# Patient Record
Sex: MALE | Race: WHITE
[De-identification: names, ages, dates, MRNs, and addresses within clinical notes are randomized per-mention and may not be internally consistent; named-entity substitution may affect disease eponyms.]

---

## 2017-04-03 ENCOUNTER — HOSPITAL ENCOUNTER (OUTPATIENT)
Dept: HOSPITAL 47 - ORWHC2ENDO | Age: 57
Discharge: HOME | End: 2017-04-03
Attending: SURGERY
Payer: COMMERCIAL

## 2017-04-03 VITALS — TEMPERATURE: 97.5 F | RESPIRATION RATE: 16 BRPM

## 2017-04-03 VITALS — BODY MASS INDEX: 22 KG/M2

## 2017-04-03 VITALS — DIASTOLIC BLOOD PRESSURE: 75 MMHG | HEART RATE: 66 BPM | SYSTOLIC BLOOD PRESSURE: 107 MMHG

## 2017-04-03 DIAGNOSIS — Z79.84: ICD-10-CM

## 2017-04-03 DIAGNOSIS — G43.909: ICD-10-CM

## 2017-04-03 DIAGNOSIS — Z79.899: ICD-10-CM

## 2017-04-03 DIAGNOSIS — Z87.891: ICD-10-CM

## 2017-04-03 DIAGNOSIS — Z79.82: ICD-10-CM

## 2017-04-03 DIAGNOSIS — I25.2: ICD-10-CM

## 2017-04-03 DIAGNOSIS — Z12.11: Primary | ICD-10-CM

## 2017-04-03 DIAGNOSIS — E11.9: ICD-10-CM

## 2017-04-03 DIAGNOSIS — I10: ICD-10-CM

## 2017-04-03 DIAGNOSIS — F12.90: ICD-10-CM

## 2017-04-03 DIAGNOSIS — I25.10: ICD-10-CM

## 2017-04-03 LAB — GLUCOSE BLD-MCNC: 156 MG/DL (ref 75–99)

## 2017-04-03 PROCEDURE — 45378 DIAGNOSTIC COLONOSCOPY: CPT

## 2017-04-03 NOTE — P.GSHP
History of Present Illness


H&P Date: 04/03/17


Chief Complaint: Screening colonoscopy





This is a 56-year-old male who presents today for screening colonoscopy.  He 

has never had a colonoscopy before.





Past Medical History


Past Medical History: Diabetes Mellitus, Hypertension, Myocardial Infarction (MI

)


Additional Past Medical History / Comment(s): hx migraines, hx ulcer,


Last Myocardial Infarction Date:: 2012


History of Any Multi-Drug Resistant Organisms: None Reported


Past Surgical History: No Surgical Hx Reported


Past Anesthesia/Blood Transfusion Reactions: No Reported Reaction


Past Psychological History: No Psychological Hx Reported


Smoking Status: Former smoker


Past Alcohol Use History: None Reported


Additional Past Alcohol Use History / Comment(s): quit smoking 16 yrs ago, 

smoked since age 6


Past Drug Use History: Marijuana





- Past Family History


  ** Sister(s)


Family Medical History: Cancer





Medications and Allergies


 Home Medications











 Medication  Instructions  Recorded  Confirmed  Type


 


Aspirin [Adult Low Dose Aspirin EC] 81 mg PO DAILY 03/30/17 04/03/17 History


 


Ergocalciferol [Vitamin D2] 50,000 unit PO Q7D 03/30/17 04/03/17 History


 


Ramipril [Altace] 2.5 mg PO 1300 03/30/17 04/03/17 History


 


metFORMIN HCL 1,000 mg PO BID 03/30/17 04/03/17 History











 Allergies











Allergy/AdvReac Type Severity Reaction Status Date / Time


 


onion Allergy  Dyspnea,throat Verified 03/30/17 16:00





   swelling  














Surgical - Exam


 Vital Signs











Temp Pulse Resp BP Pulse Ox


 


 97.5 F L  73   16   114/78   99 


 


 04/03/17 07:18  04/03/17 07:18  04/03/17 07:18  04/03/17 07:18  04/03/17 07:18














- General


well developed, no distress





- Eyes


PERRL





- ENT


normal pinna





- Neck


no masses





- Respiratory


normal expansion





- Cardiovascular


Rhythm: regular





- Abdomen


Abdomen: soft, non tender





Results





- Labs


 Abnormal Lab Results - Last 24 Hours (Table)











  04/03/17 Range/Units





  07:22 


 


POC Glucose (mg/dL)  156 H  (75-99)  mg/dL














Assessment and Plan


Plan: 





We'll perform initial screening colonoscopy.

## 2017-04-03 NOTE — P.OP
Date of Procedure: 04/03/17


Preoperative Diagnosis: 


Screening colonoscopy


Postoperative Diagnosis: 


Normal colonoscopy


Procedure(s) Performed: 


Colonoscopy


Anesthesia: MAC


Surgeon: Bandar Hines


Pathology: none sent


Condition: stable


Disposition: PACU


Description of Procedure: 





PROCEDURE:  The patient was placed on the endoscopy table in the lateral 

position.  Digital rectal examination was performed which revealed no 

abnormalities.  The prostate was symmetrical without nodules.  Flexible 

colonoscope was then placed in the patient's anus and passed throughout the 

entire colon.  The ileocecal valve was visualized.  The cecum, ascending, 

transverse, descending and sigmoid colon were normal.  The rectum was normal as 

well.  There were no masses, polyps or diverticula noted in the entire colon. 





SUMMARY OF FINDINGS:  


Normal colonoscopy.

## 2018-05-09 ENCOUNTER — HOSPITAL ENCOUNTER (INPATIENT)
Dept: HOSPITAL 47 - EC | Age: 58
LOS: 2 days | Discharge: HOME | DRG: 369 | End: 2018-05-11
Payer: COMMERCIAL

## 2018-05-09 VITALS — RESPIRATION RATE: 16 BRPM

## 2018-05-09 DIAGNOSIS — Z79.84: ICD-10-CM

## 2018-05-09 DIAGNOSIS — K29.70: ICD-10-CM

## 2018-05-09 DIAGNOSIS — D62: ICD-10-CM

## 2018-05-09 DIAGNOSIS — D72.829: ICD-10-CM

## 2018-05-09 DIAGNOSIS — Z79.82: ICD-10-CM

## 2018-05-09 DIAGNOSIS — E78.5: ICD-10-CM

## 2018-05-09 DIAGNOSIS — Z91.018: ICD-10-CM

## 2018-05-09 DIAGNOSIS — G43.909: ICD-10-CM

## 2018-05-09 DIAGNOSIS — I25.2: ICD-10-CM

## 2018-05-09 DIAGNOSIS — Z79.899: ICD-10-CM

## 2018-05-09 DIAGNOSIS — K22.6: Primary | ICD-10-CM

## 2018-05-09 DIAGNOSIS — Z87.891: ICD-10-CM

## 2018-05-09 DIAGNOSIS — I10: ICD-10-CM

## 2018-05-09 DIAGNOSIS — E11.51: ICD-10-CM

## 2018-05-09 DIAGNOSIS — E11.40: ICD-10-CM

## 2018-05-09 LAB
ALBUMIN SERPL-MCNC: 4 G/DL (ref 3.5–5)
ALP SERPL-CCNC: 30 U/L (ref 38–126)
ALT SERPL-CCNC: 21 U/L (ref 21–72)
ANION GAP SERPL CALC-SCNC: 12 MMOL/L
APTT BLD: 21.6 SEC (ref 22–30)
AST SERPL-CCNC: 17 U/L (ref 17–59)
BASOPHILS # BLD AUTO: 0 K/UL (ref 0–0.2)
BASOPHILS # BLD AUTO: 0 K/UL (ref 0–0.2)
BASOPHILS NFR BLD AUTO: 0 %
BASOPHILS NFR BLD AUTO: 0 %
BUN SERPL-SCNC: 25 MG/DL (ref 9–20)
CALCIUM SPEC-MCNC: 8.9 MG/DL (ref 8.4–10.2)
CHLORIDE SERPL-SCNC: 104 MMOL/L (ref 98–107)
CK SERPL-CCNC: 73 U/L (ref 55–170)
CO2 SERPL-SCNC: 27 MMOL/L (ref 22–30)
EOSINOPHIL # BLD AUTO: 0 K/UL (ref 0–0.7)
EOSINOPHIL # BLD AUTO: 0.1 K/UL (ref 0–0.7)
EOSINOPHIL NFR BLD AUTO: 0 %
EOSINOPHIL NFR BLD AUTO: 0 %
ERYTHROCYTE [DISTWIDTH] IN BLOOD BY AUTOMATED COUNT: 3.64 M/UL (ref 4.3–5.9)
ERYTHROCYTE [DISTWIDTH] IN BLOOD BY AUTOMATED COUNT: 4.38 M/UL (ref 4.3–5.9)
ERYTHROCYTE [DISTWIDTH] IN BLOOD: 12.9 % (ref 11.5–15.5)
ERYTHROCYTE [DISTWIDTH] IN BLOOD: 12.9 % (ref 11.5–15.5)
GLUCOSE BLD-MCNC: 125 MG/DL (ref 75–99)
GLUCOSE BLD-MCNC: 163 MG/DL (ref 75–99)
GLUCOSE SERPL-MCNC: 191 MG/DL (ref 74–99)
HCT VFR BLD AUTO: 30.6 % (ref 39–53)
HCT VFR BLD AUTO: 36.7 % (ref 39–53)
HGB BLD-MCNC: 10.9 GM/DL (ref 13–17.5)
HGB BLD-MCNC: 13.4 GM/DL (ref 13–17.5)
INR PPP: 1.2 (ref ?–1.2)
LYMPHOCYTES # SPEC AUTO: 1.6 K/UL (ref 1–4.8)
LYMPHOCYTES # SPEC AUTO: 1.8 K/UL (ref 1–4.8)
LYMPHOCYTES NFR SPEC AUTO: 12 %
LYMPHOCYTES NFR SPEC AUTO: 14 %
MCH RBC QN AUTO: 30 PG (ref 25–35)
MCH RBC QN AUTO: 30.6 PG (ref 25–35)
MCHC RBC AUTO-ENTMCNC: 35.7 G/DL (ref 31–37)
MCHC RBC AUTO-ENTMCNC: 36.5 G/DL (ref 31–37)
MCV RBC AUTO: 83.9 FL (ref 80–100)
MCV RBC AUTO: 84 FL (ref 80–100)
MONOCYTES # BLD AUTO: 0.4 K/UL (ref 0–1)
MONOCYTES # BLD AUTO: 0.6 K/UL (ref 0–1)
MONOCYTES NFR BLD AUTO: 3 %
MONOCYTES NFR BLD AUTO: 5 %
NEUTROPHILS # BLD AUTO: 10.1 K/UL (ref 1.3–7.7)
NEUTROPHILS # BLD AUTO: 11.1 K/UL (ref 1.3–7.7)
NEUTROPHILS NFR BLD AUTO: 80 %
NEUTROPHILS NFR BLD AUTO: 84 %
PLATELET # BLD AUTO: 216 K/UL (ref 150–450)
PLATELET # BLD AUTO: 256 K/UL (ref 150–450)
POTASSIUM SERPL-SCNC: 3.8 MMOL/L (ref 3.5–5.1)
PROT SERPL-MCNC: 6.3 G/DL (ref 6.3–8.2)
PT BLD: 11.3 SEC (ref 9–12)
SALICYLATES SERPL-MCNC: <1 MG/DL
SODIUM SERPL-SCNC: 143 MMOL/L (ref 137–145)
TROPONIN I SERPL-MCNC: <0.012 NG/ML (ref 0–0.03)
WBC # BLD AUTO: 12.6 K/UL (ref 3.8–10.6)
WBC # BLD AUTO: 13.3 K/UL (ref 3.8–10.6)

## 2018-05-09 PROCEDURE — 36415 COLL VENOUS BLD VENIPUNCTURE: CPT

## 2018-05-09 PROCEDURE — 84484 ASSAY OF TROPONIN QUANT: CPT

## 2018-05-09 PROCEDURE — 83036 HEMOGLOBIN GLYCOSYLATED A1C: CPT

## 2018-05-09 PROCEDURE — 99285 EMERGENCY DEPT VISIT HI MDM: CPT

## 2018-05-09 PROCEDURE — 86850 RBC ANTIBODY SCREEN: CPT

## 2018-05-09 PROCEDURE — 96375 TX/PRO/DX INJ NEW DRUG ADDON: CPT

## 2018-05-09 PROCEDURE — 82553 CREATINE MB FRACTION: CPT

## 2018-05-09 PROCEDURE — 93306 TTE W/DOPPLER COMPLETE: CPT

## 2018-05-09 PROCEDURE — 85730 THROMBOPLASTIN TIME PARTIAL: CPT

## 2018-05-09 PROCEDURE — 43235 EGD DIAGNOSTIC BRUSH WASH: CPT

## 2018-05-09 PROCEDURE — 93005 ELECTROCARDIOGRAM TRACING: CPT

## 2018-05-09 PROCEDURE — 85025 COMPLETE CBC W/AUTO DIFF WBC: CPT

## 2018-05-09 PROCEDURE — 85610 PROTHROMBIN TIME: CPT

## 2018-05-09 PROCEDURE — 96374 THER/PROPH/DIAG INJ IV PUSH: CPT

## 2018-05-09 PROCEDURE — 96361 HYDRATE IV INFUSION ADD-ON: CPT

## 2018-05-09 PROCEDURE — 83605 ASSAY OF LACTIC ACID: CPT

## 2018-05-09 PROCEDURE — 86900 BLOOD TYPING SEROLOGIC ABO: CPT

## 2018-05-09 PROCEDURE — 82271 OCCULT BLOOD OTHER SOURCES: CPT

## 2018-05-09 PROCEDURE — 80053 COMPREHEN METABOLIC PANEL: CPT

## 2018-05-09 PROCEDURE — 86901 BLOOD TYPING SEROLOGIC RH(D): CPT

## 2018-05-09 PROCEDURE — 82550 ASSAY OF CK (CPK): CPT

## 2018-05-09 PROCEDURE — 80048 BASIC METABOLIC PNL TOTAL CA: CPT

## 2018-05-09 PROCEDURE — 83520 IMMUNOASSAY QUANT NOS NONAB: CPT

## 2018-05-09 RX ADMIN — PANTOPRAZOLE SODIUM SCH MG: 40 INJECTION, POWDER, FOR SOLUTION INTRAVENOUS at 20:05

## 2018-05-09 NOTE — ED
General Adult HPI





- General


Chief complaint: GI Bleed


Stated complaint: Vomiting Blood


Time Seen by Provider: 05/09/18 13:20


Source: patient, EMS, RN notes reviewed


Mode of arrival: EMS


Limitations: no limitations





- History of Present Illness


Initial comments: 





This is a 57-year-old male who presents emergency Department with a complaint 

of syncope.  Patient states he got up and felt lightheaded and nauseated went 

to the bathroom and vomited up blood 3 times and when EMS arrived he was 

unresponsive.  He became responsive soon thereafter.  Patient denies any 

drinking history patient denies any previous history of vomiting up blood.  

Patient denies any blood disorders.  Patient denies any pain.  Patient 

currently states he has no headache no numbness or weakness.  Patient denies 

lightheadedness while lying in bed.  Patient denies chest pain palpitations 

difficulty breathing shortness of breath.  Patient denies any abdominal pain.  

Patient states he still mildly nauseated.  Patient denies any recent fever 

chills or cough.  Patient is unaware of why this is happening.





- Related Data


 Home Medications











 Medication  Instructions  Recorded  Confirmed


 


Aspirin [Adult Low Dose Aspirin EC] 81 mg PO DAILY 03/30/17 05/09/18


 


Ergocalciferol [Vitamin D2] 50,000 unit PO SA 03/30/17 05/09/18


 


Ramipril [Altace] 2.5 mg PO DAILY 03/30/17 05/09/18


 


metFORMIN HCL 1,000 mg PO BID 03/30/17 05/09/18


 


Cilostazol [Pletal] 100 mg PO BID 05/09/18 05/09/18


 


Fenofibrate Nanocrystallized 145 mg PO DAILY 05/09/18 05/09/18





[Fenofibrate]   


 


Gabapentin 600 mg PO HS 05/09/18 05/09/18











 Allergies











Allergy/AdvReac Type Severity Reaction Status Date / Time


 


onion Allergy  Dyspnea,throat Verified 05/09/18 13:37





   swelling  














Review of Systems


ROS Statement: 


Those systems with pertinent positive or pertinent negative responses have been 

documented in the HPI.





ROS Other: All systems not noted in ROS Statement are negative.





Past Medical History


Past Medical History: Diabetes Mellitus, Hypertension, Myocardial Infarction (MI

)


Additional Past Medical History / Comment(s): hx migraines, hx ulcer,


Last Myocardial Infarction Date:: 2012


History of Any Multi-Drug Resistant Organisms: None Reported


Past Surgical History: No Surgical Hx Reported


Past Anesthesia/Blood Transfusion Reactions: No Reported Reaction


Past Psychological History: No Psychological Hx Reported


Smoking Status: Former smoker


Past Alcohol Use History: None Reported


Past Drug Use History: Marijuana





- Past Family History


  ** Sister(s)


Family Medical History: Cancer





General Exam





- General Exam Comments


Initial Comments: 





GENERAL:


Patient is well-developed and well-nourished.  Patient is nontoxic and well-

hydrated and is in mild distress.





ENT:


Neck is soft and supple.  No significant lymphadenopathy is noted.  Oropharynx 

is clear.  Moist mucous membranes.  Neck has full range of motion without 

eliciting any pain.  





EYES:


The sclera were anicteric and conjunctiva were pink and moist.  Extraocular 

movements were intact and pupils were equal round and reactive to light.  

Eyelids were unremarkable.





PULMONARY:


Unlabored respirations.  Good breath sounds bilaterally.  No audible rales 

rhonchi or wheezing was noted.





CARDIOVASCULAR:


There is a regular rate and rhythm without any murmurs gallops or rubs.  





ABDOMEN:


Soft and nontender with normal bowel sounds.  No palpable organomegaly was 

noted.  There is no palpable pulsatile mass.





SKIN:


Skin is clear with no lesions or rashes and otherwise unremarkable.





NEUROLOGIC:


Patient is alert and oriented x3.  Cranial nerves II through XII are grossly 

intact.  Motor and sensory are also intact.  Normal speech, volume and content.

  Symmetrical smile.  





MUSCULOSKELETAL:


Normal extremities with adequate strength and full range of motion.  No lower 

extremity swelling or edema.  No calf tenderness.





LYMPHATICS:


No significant lymphadenopathy is noted





PSYCHIATRIC:


Normal psychiatric evaluation.  Normal interpersonal interactions appears 

functionally intact in deals appropriately with others.  No signs of 

depression.  No signs of anxiety. 


Limitations: no limitations





Course


 Vital Signs











  05/09/18 05/09/18 05/09/18





  13:18 14:00 14:25


 


Temperature 96.9 F L  


 


Pulse Rate 96 75 96


 


Respiratory 16 20 20





Rate   


 


Blood Pressure 125/79 126/85 113/76


 


O2 Sat by Pulse 97 99 100





Oximetry   














Medical Decision Making





- Medical Decision Making





EKG shows normal sinus rhythm at 89 bpm SC interval is 124 tresses 84 QT 

interval 370 QTC is 450.  Patient's EKG shows no ST segment elevation or 

depression or T wave abnormalities are noted.





Patient vomited while in the emergency department it did look like hematemesis 

however when we tested it came up negative.





I spoke with Dr. Mera I will be admitting the patient I will be consulting GI 

and I will be doing repeat CBCs.





I wrote admitting orders.





- Lab Data


Result diagrams: 


 05/09/18 14:05





 05/09/18 14:05


 Lab Results











  05/09/18 05/09/18 05/09/18 Range/Units





  14:05 14:05 14:05 


 


WBC   13.3 H   (3.8-10.6)  k/uL


 


RBC   4.38   (4.30-5.90)  m/uL


 


Hgb   13.4   (13.0-17.5)  gm/dL


 


Hct   36.7 L   (39.0-53.0)  %


 


MCV   83.9   (80.0-100.0)  fL


 


MCH   30.6   (25.0-35.0)  pg


 


MCHC   36.5   (31.0-37.0)  g/dL


 


RDW   12.9   (11.5-15.5)  %


 


Plt Count   256   (150-450)  k/uL


 


Neutrophils %   84   %


 


Lymphocytes %   12   %


 


Monocytes %   3   %


 


Eosinophils %   0   %


 


Basophils %   0   %


 


Neutrophils #   11.1 H   (1.3-7.7)  k/uL


 


Lymphocytes #   1.6   (1.0-4.8)  k/uL


 


Monocytes #   0.4   (0-1.0)  k/uL


 


Eosinophils #   0.1   (0-0.7)  k/uL


 


Basophils #   0.0   (0-0.2)  k/uL


 


PT     (9.0-12.0)  sec


 


INR     (<1.2)  


 


APTT     (22.0-30.0)  sec


 


Sodium    143  (137-145)  mmol/L


 


Potassium    3.8  (3.5-5.1)  mmol/L


 


Chloride    104  ()  mmol/L


 


Carbon Dioxide    27  (22-30)  mmol/L


 


Anion Gap    12  mmol/L


 


BUN    25 H  (9-20)  mg/dL


 


Creatinine    0.71  (0.66-1.25)  mg/dL


 


Est GFR (CKD-EPI)AfAm    >90  (>60 ml/min/1.73 sqM)  


 


Est GFR (CKD-EPI)NonAf    >90  (>60 ml/min/1.73 sqM)  


 


Glucose    191 H  (74-99)  mg/dL


 


Plasma Lactic Acid Alin     (0.7-2.0)  mmol/L


 


Calcium    8.9  (8.4-10.2)  mg/dL


 


Total Bilirubin    0.4  (0.2-1.3)  mg/dL


 


AST    17  (17-59)  U/L


 


ALT    21  (21-72)  U/L


 


Alkaline Phosphatase    30 L  ()  U/L


 


Total Creatine Kinase  73    ()  U/L


 


CK-MB (CK-2)  1.6    (0.0-2.4)  ng/mL


 


CK-MB (CK-2) Rel Index  2.2    


 


Troponin I  <0.012    (0.000-0.034)  ng/mL


 


Total Protein    6.3  (6.3-8.2)  g/dL


 


Albumin    4.0  (3.5-5.0)  g/dL


 


Gastric Occult Blood     (Negative)  


 


Salicylates    <1.0  mg/dL


 


Blood Type     


 


Blood Type Recheck     


 


Antibody Screen     


 


Spec Expiration Date     














  05/09/18 05/09/18 05/09/18 Range/Units





  14:05 14:05 14:05 


 


WBC     (3.8-10.6)  k/uL


 


RBC     (4.30-5.90)  m/uL


 


Hgb     (13.0-17.5)  gm/dL


 


Hct     (39.0-53.0)  %


 


MCV     (80.0-100.0)  fL


 


MCH     (25.0-35.0)  pg


 


MCHC     (31.0-37.0)  g/dL


 


RDW     (11.5-15.5)  %


 


Plt Count     (150-450)  k/uL


 


Neutrophils %     %


 


Lymphocytes %     %


 


Monocytes %     %


 


Eosinophils %     %


 


Basophils %     %


 


Neutrophils #     (1.3-7.7)  k/uL


 


Lymphocytes #     (1.0-4.8)  k/uL


 


Monocytes #     (0-1.0)  k/uL


 


Eosinophils #     (0-0.7)  k/uL


 


Basophils #     (0-0.2)  k/uL


 


PT  11.3    (9.0-12.0)  sec


 


INR  1.2 H    (<1.2)  


 


APTT  21.6 L    (22.0-30.0)  sec


 


Sodium     (137-145)  mmol/L


 


Potassium     (3.5-5.1)  mmol/L


 


Chloride     ()  mmol/L


 


Carbon Dioxide     (22-30)  mmol/L


 


Anion Gap     mmol/L


 


BUN     (9-20)  mg/dL


 


Creatinine     (0.66-1.25)  mg/dL


 


Est GFR (CKD-EPI)AfAm     (>60 ml/min/1.73 sqM)  


 


Est GFR (CKD-EPI)NonAf     (>60 ml/min/1.73 sqM)  


 


Glucose     (74-99)  mg/dL


 


Plasma Lactic Acid Alin    1.1  (0.7-2.0)  mmol/L


 


Calcium     (8.4-10.2)  mg/dL


 


Total Bilirubin     (0.2-1.3)  mg/dL


 


AST     (17-59)  U/L


 


ALT     (21-72)  U/L


 


Alkaline Phosphatase     ()  U/L


 


Total Creatine Kinase     ()  U/L


 


CK-MB (CK-2)     (0.0-2.4)  ng/mL


 


CK-MB (CK-2) Rel Index     


 


Troponin I     (0.000-0.034)  ng/mL


 


Total Protein     (6.3-8.2)  g/dL


 


Albumin     (3.5-5.0)  g/dL


 


Gastric Occult Blood     (Negative)  


 


Salicylates     mg/dL


 


Blood Type   A Negative   


 


Blood Type Recheck   CABO Indicated   


 


Antibody Screen   NEGATIVE   


 


Spec Expiration Date   05/12/2018 - 2305 05/09/18 Range/Units





  14:25 


 


WBC   (3.8-10.6)  k/uL


 


RBC   (4.30-5.90)  m/uL


 


Hgb   (13.0-17.5)  gm/dL


 


Hct   (39.0-53.0)  %


 


MCV   (80.0-100.0)  fL


 


MCH   (25.0-35.0)  pg


 


MCHC   (31.0-37.0)  g/dL


 


RDW   (11.5-15.5)  %


 


Plt Count   (150-450)  k/uL


 


Neutrophils %   %


 


Lymphocytes %   %


 


Monocytes %   %


 


Eosinophils %   %


 


Basophils %   %


 


Neutrophils #   (1.3-7.7)  k/uL


 


Lymphocytes #   (1.0-4.8)  k/uL


 


Monocytes #   (0-1.0)  k/uL


 


Eosinophils #   (0-0.7)  k/uL


 


Basophils #   (0-0.2)  k/uL


 


PT   (9.0-12.0)  sec


 


INR   (<1.2)  


 


APTT   (22.0-30.0)  sec


 


Sodium   (137-145)  mmol/L


 


Potassium   (3.5-5.1)  mmol/L


 


Chloride   ()  mmol/L


 


Carbon Dioxide   (22-30)  mmol/L


 


Anion Gap   mmol/L


 


BUN   (9-20)  mg/dL


 


Creatinine   (0.66-1.25)  mg/dL


 


Est GFR (CKD-EPI)AfAm   (>60 ml/min/1.73 sqM)  


 


Est GFR (CKD-EPI)NonAf   (>60 ml/min/1.73 sqM)  


 


Glucose   (74-99)  mg/dL


 


Plasma Lactic Acid Alin   (0.7-2.0)  mmol/L


 


Calcium   (8.4-10.2)  mg/dL


 


Total Bilirubin   (0.2-1.3)  mg/dL


 


AST   (17-59)  U/L


 


ALT   (21-72)  U/L


 


Alkaline Phosphatase   ()  U/L


 


Total Creatine Kinase   ()  U/L


 


CK-MB (CK-2)   (0.0-2.4)  ng/mL


 


CK-MB (CK-2) Rel Index   


 


Troponin I   (0.000-0.034)  ng/mL


 


Total Protein   (6.3-8.2)  g/dL


 


Albumin   (3.5-5.0)  g/dL


 


Gastric Occult Blood  Negative  (Negative)  


 


Salicylates   mg/dL


 


Blood Type   


 


Blood Type Recheck   


 


Antibody Screen   


 


Spec Expiration Date   














Disposition


Clinical Impression: 


 Syncope, Acute vomiting





Disposition: ADMITTED AS IP TO THIS HOSP


Referrals: 


Susie Theodore DO [Primary Care Provider] - 1-2 days


Time of Disposition: 15:07

## 2018-05-10 LAB
ANION GAP SERPL CALC-SCNC: 10 MMOL/L
BASOPHILS # BLD AUTO: 0 K/UL (ref 0–0.2)
BASOPHILS NFR BLD AUTO: 0 %
BUN SERPL-SCNC: 17 MG/DL (ref 9–20)
CALCIUM SPEC-MCNC: 8.5 MG/DL (ref 8.4–10.2)
CHLORIDE SERPL-SCNC: 107 MMOL/L (ref 98–107)
CO2 SERPL-SCNC: 26 MMOL/L (ref 22–30)
EOSINOPHIL # BLD AUTO: 0.1 K/UL (ref 0–0.7)
EOSINOPHIL NFR BLD AUTO: 1 %
ERYTHROCYTE [DISTWIDTH] IN BLOOD BY AUTOMATED COUNT: 3.81 M/UL (ref 4.3–5.9)
ERYTHROCYTE [DISTWIDTH] IN BLOOD: 13.3 % (ref 11.5–15.5)
GLUCOSE BLD-MCNC: 105 MG/DL (ref 75–99)
GLUCOSE BLD-MCNC: 120 MG/DL (ref 75–99)
GLUCOSE BLD-MCNC: 122 MG/DL (ref 75–99)
GLUCOSE BLD-MCNC: 129 MG/DL (ref 75–99)
GLUCOSE SERPL-MCNC: 121 MG/DL (ref 74–99)
HBA1C MFR BLD: 5.7 % (ref 4–6)
HCT VFR BLD AUTO: 32.8 % (ref 39–53)
HGB BLD-MCNC: 11.2 GM/DL (ref 13–17.5)
LYMPHOCYTES # SPEC AUTO: 2.3 K/UL (ref 1–4.8)
LYMPHOCYTES NFR SPEC AUTO: 21 %
MCH RBC QN AUTO: 29.3 PG (ref 25–35)
MCHC RBC AUTO-ENTMCNC: 34.1 G/DL (ref 31–37)
MCV RBC AUTO: 86.1 FL (ref 80–100)
MONOCYTES # BLD AUTO: 0.5 K/UL (ref 0–1)
MONOCYTES NFR BLD AUTO: 5 %
NEUTROPHILS # BLD AUTO: 7.9 K/UL (ref 1.3–7.7)
NEUTROPHILS NFR BLD AUTO: 73 %
PLATELET # BLD AUTO: 218 K/UL (ref 150–450)
POTASSIUM SERPL-SCNC: 3.5 MMOL/L (ref 3.5–5.1)
SODIUM SERPL-SCNC: 143 MMOL/L (ref 137–145)
WBC # BLD AUTO: 10.9 K/UL (ref 3.8–10.6)

## 2018-05-10 RX ADMIN — INSULIN ASPART SCH: 100 INJECTION, SOLUTION INTRAVENOUS; SUBCUTANEOUS at 13:40

## 2018-05-10 RX ADMIN — PANTOPRAZOLE SODIUM SCH MG: 40 INJECTION, POWDER, FOR SOLUTION INTRAVENOUS at 20:59

## 2018-05-10 RX ADMIN — INSULIN ASPART SCH: 100 INJECTION, SOLUTION INTRAVENOUS; SUBCUTANEOUS at 17:29

## 2018-05-10 RX ADMIN — INSULIN ASPART SCH: 100 INJECTION, SOLUTION INTRAVENOUS; SUBCUTANEOUS at 20:59

## 2018-05-10 RX ADMIN — PANTOPRAZOLE SODIUM SCH MG: 40 INJECTION, POWDER, FOR SOLUTION INTRAVENOUS at 08:39

## 2018-05-10 NOTE — ECHOF
Referral Reason:syncope



MEASUREMENTS

--------

HEIGHT: 172.7 cm

WEIGHT: 63.0 kg

BP: 108/70

RVIDd:   3.0 cm     (< 3.3)

IVSd:   1.1 cm     (0.6 - 1.1)

LVIDd:   4.4 cm     (3.9 - 5.3)

LVPWd:   0.9 cm     (0.6 - 1.1)

IVSs:   1.6 cm

LVIDs:   2.9 cm

LVPWs:   1.4 cm

LA Diam:   3.3 cm     (2.7 - 3.8)

LAESV Index (A-L):   17.55 ml/m

Ao Diam:   3.2 cm     (2.0 - 3.7)

AV Cusp:   2.0 cm     (1.5 - 2.6)

MV EXCURSION:   20.651 mm     (> 18.000)

MV EF SLOPE:   133 mm/s     (70 - 150)

EPSS:   0.4 cm

MV E Tavares:   0.90 m/s

MV DecT:   211 ms

MV A Tavares:   0.77 m/s

MV E/A Ratio:   1.18 







FINDINGS

--------

Sinus rhythm.

This was a technically good study.

The left ventricular size is normal.   There is borderline concentric left ventricular hypertrophy.  
 Overall left ventricular systolic function is normal with, an EF between 55 - 60 %.

The right ventricle is normal in size.

Normal LA  size by volume 22+/-6 ml/m2.

The right atrium is normal in size.

The aortic valve is trileaflet and appears structurally normal.

The mitral valve is normal.

The tricuspid valve appears structurally normal.

There is no pulmonic regurgitation present.

The aortic root size is normal.

Normal inferior vena cava with normal inspiratory collapse consistent with estimated right atrial pre
ssure of  5 mmHg.

There is no pericardial effusion.



CONCLUSIONS

--------

1. Sinus rhythm.

2. This was a technically good study.

3. The left ventricular size is normal.

4. There is borderline concentric left ventricular hypertrophy.

5. Overall left ventricular systolic function is normal with, an EF between 55 - 60 %.

6. The right ventricle is normal in size.

7. Normal LA size by volume 22+/-6 ml/m2.

8. The right atrium is normal in size.

9. The aortic valve is trileaflet and appears structurally normal.

10. The mitral valve is normal.

11. The tricuspid valve appears structurally normal.

12. There is no pulmonic regurgitation present.

13. The aortic root size is normal.

14. Normal inferior vena cava with normal inspiratory collapse consistent with estimated right atrial
 pressure of  5 mmHg.

15. There is no pericardial effusion.





SONOGRAPHER: Perri Hampton RDCS

## 2018-05-10 NOTE — P.HPIM
History of Present Illness


H&P Date: 05/10/18


Chief Complaint: Vomiting blood





This is a 57-year-old male, a patient of [facility].  Patient has 

a past medical history of diabetes mellitus, hypertension, myocardial infarction

, hyperlipidemia, peripheral vascular disease.  Patient presents to the 

emergency room after having hematemesis as well as a syncopal episode.  Patient 

reports that he had not slept well the night before and woke up in the morning 

and vomited what he presumed as blood 3 times.  He does report drinking a lot 

of coffee.  When he was coming back from the restroom he passed out.  He is 

unsure of how long he passed out for.  Family members called EMS.  And 

initially when EMS arrived patient was unresponsive.  When he came to he was 

being placed in the ambulance.  Hemoglobin on admission 10.9 white count 

elevated at 12.6.  Gastric occult blood was negative on admission.  Patient had 

one episode of emesis in the ER.  He is currently nothing by mouth.  He's had 

no further emesis.  He aspirin and Pletal have been placed on hold.  Repeat 

hemoglobin this morning is 11.2.  Patient seen by GI service and offered have 

an EGD for tomorrow morning.  At this time patient is refusing.  Also requests 

patient syncopal episode and echocardiogram has been ordered as well as 

orthostatics.  Patient has been up and ambulating this morning without any 

dizziness or lightheadedness.  Denies any chest pain or shortness of breath.  

Denies any further episodes of vomiting.  Denies any bowel movement changes or 

urinary symptoms.  No previous history of GI bleed.  Denies any nicotine use or 

alcohol use.  EKG normal sinus rhythm on admission.  Patient does admit to 

having issues with heartburn on a regular basis.  He does drink about 4 cups of 

coffee a day.





Review of Systems





Please refer to HPI otherwise unremarkable





Past Medical History


Past Medical History: Diabetes Mellitus, Hypertension, Myocardial Infarction (MI

)


Additional Past Medical History / Comment(s): hx migraines, hx ulcer, on 

gabapentin when asked if he had neuropathy -he stated no


Last Myocardial Infarction Date:: 


History of Any Multi-Drug Resistant Organisms: None Reported


Past Surgical History: No Surgical Hx Reported


Additional Past Surgical History / Comment(s): colonoscopy denies having egd or 

heart caths


Past Anesthesia/Blood Transfusion Reactions: No Reported Reaction


Smoking Status: Former smoker





- Past Family History


  ** Sister(s)


Family Medical History: Cancer





  ** Father


Family Medical History: Unable to Obtain


Additional Family Medical History / Comment(s): pt stated he never knew his dad





  ** Mother


Additional Family Medical History / Comment(s): stated  from poss 

medication reaction





Medications and Allergies


 Home Medications











 Medication  Instructions  Recorded  Confirmed  Type


 


Aspirin [Adult Low Dose Aspirin EC] 81 mg PO DAILY 17 History


 


Ergocalciferol [Vitamin D2] 50,000 unit PO SA 17 History


 


Ramipril [Altace] 2.5 mg PO DAILY 17 History


 


metFORMIN HCL 1,000 mg PO BID 17 History


 


Cilostazol [Pletal] 100 mg PO BID 18 History


 


Fenofibrate Nanocrystallized 145 mg PO DAILY 18 History





[Fenofibrate]    


 


Gabapentin 600 mg PO HS 18 History











 Allergies











Allergy/AdvReac Type Severity Reaction Status Date / Time


 


onion Allergy  Dyspnea,throat Verified 18 13:37





   swelling  














Physical Exam


Vitals: 


 Vital Signs











  Temp Pulse Pulse Resp BP BP Pulse Ox


 


 05/10/18 08:00  97.8 F   84  16   108/70  99


 


 05/10/18 04:00  98 F   97  16   119/75  97


 


 18 23:37  98.5 F   91  16   123/71  98


 


 18 20:00  97.8 F   91  16   119/72  100


 


 18 17:06  97.3 F L   88  16   110/73  98


 


 18 16:00  98.3 F  85   18  111/72   100


 


 18 15:30   85   20  107/74   100


 


 18 15:00   87   20  114/76   100


 


 18 14:25   96   20  113/76   100


 


 18 14:00   75   20  126/85   99


 


 18 13:18  96.9 F L  96   16  125/79   97








 Intake and Output











 05/09/18 05/10/18 05/10/18





 22:59 06:59 14:59


 


Intake Total 800 80 


 


Output Total 600  


 


Balance 200 80 


 


Intake:   


 


   80 


 


    0.9  80 


 


    Sodium Chloride 0.9% 1, 800  





    000 ml @ 100 mls/hr IV .   





    Q10H ONE Rx#:[id number]   


 


Output:   


 


  Urine 600  


 


Other:   


 


  Voiding Method Toilet Toilet Toilet


 


  Weight  63.3 kg 














Head normocephalic


Neck supple


Lungs clear to auscultation bilaterally no wheezing or crackles


Heart regular rate and rhythm S1-S2, no rub or gallop


Abdomen is soft nontender nondistended positive bowel sounds no 

hepatosplenomegaly


Extremities no edema


Neuro alert and orientated to 3





Results


CBC & Chem 7: 


 05/10/18 06:06





 05/10/18 06:06


Labs: 


 Abnormal Lab Results - Last 24 Hours (Table)











  18 Range/Units





  14:05 14:05 14:05 


 


WBC  13.3 H    (3.8-10.6)  k/uL


 


RBC     (4.30-5.90)  m/uL


 


Hgb     (13.0-17.5)  gm/dL


 


Hct  36.7 L    (39.0-53.0)  %


 


Neutrophils #  11.1 H    (1.3-7.7)  k/uL


 


INR    1.2 H  (<1.2)  


 


APTT    21.6 L  (22.0-30.0)  sec


 


BUN   25 H   (9-20)  mg/dL


 


Glucose   191 H   (74-99)  mg/dL


 


POC Glucose (mg/dL)     (75-99)  mg/dL


 


Alkaline Phosphatase   30 L   ()  U/L














  18 Range/Units





  16:55 20:37 22:51 


 


WBC    12.6 H  (3.8-10.6)  k/uL


 


RBC    3.64 L  (4.30-5.90)  m/uL


 


Hgb    10.9 L  (13.0-17.5)  gm/dL


 


Hct    30.6 L  (39.0-53.0)  %


 


Neutrophils #    10.1 H  (1.3-7.7)  k/uL


 


INR     (<1.2)  


 


APTT     (22.0-30.0)  sec


 


BUN     (9-20)  mg/dL


 


Glucose     (74-99)  mg/dL


 


POC Glucose (mg/dL)  163 H  125 H   (75-99)  mg/dL


 


Alkaline Phosphatase     ()  U/L














  05/10/18 05/10/18 05/10/18 Range/Units





  05:53 06:06 06:06 


 


WBC   10.9 H   (3.8-10.6)  k/uL


 


RBC   3.81 L   (4.30-5.90)  m/uL


 


Hgb   11.2 L   (13.0-17.5)  gm/dL


 


Hct   32.8 L   (39.0-53.0)  %


 


Neutrophils #   7.9 H   (1.3-7.7)  k/uL


 


INR     (<1.2)  


 


APTT     (22.0-30.0)  sec


 


BUN     (9-20)  mg/dL


 


Glucose    121 H  (74-99)  mg/dL


 


POC Glucose (mg/dL)  120 H    (75-99)  mg/dL


 


Alkaline Phosphatase     ()  U/L














Assessment and Plan


Assessment: 





1.  Hematemesis: Concerns for possible peptic ulcer disease.  Patient seen by 

GI service and they are recommending a EGD.  Patient is refusing EGD at this 

time.  Would like to have it completed in the outpatient setting on Monday.  

Patient is on Protonix.  Aspirin and Pletal on hold





2.  Syncopal episode likely vasovagal due to the hematemesis.  However, will 

check orthostatic blood pressure is also 2-D echo








3.  Leukocytosis present on admission likely reactive: White count has 

decreased total 0.6-10.9





4.  Diabetes mellitus type 2: Resume metformin and sliding scale coverage





5.  Essential hypertension: Blood pressures stable but on the lower side at 

this time we'll hold his lisinopril





6.  Peripheral vascular disease: Hold Pletal for now due to the hematemesis





7.  Hyperlipidemia continue fenofibrate


Time with Patient: Greater than 30 (Greater than 50% of the total time spent in 

counseling and coordination of care.I performed an examination of the patient 

and discussed their management with the physician Assistant.  I have reviewed 

the Physician Assistant's notes and agree with the documented findings and plan 

of care)

## 2018-05-10 NOTE — P.CONS
History of Present Illness





- Reason for Consult


Consult date: 05/10/18


GI bleed hematemesis


Requesting physician: Annmarie Mera





- History of Present Illness


57-year-old male PMH diabetes, hypertension, migraines, neuropathy, admitted 

with acute syncopal episode with hematemesis that occurred yesterday.  He 

developed increased nausea followed by 5-6 episodes of burgundy-colored emesis.

  Presently denies abdominal pain.  Denies dyspepsia or dysphagia indigestion.  

Occasional over-the-counter Tums as needed.  Denies melena or hematochezia.  No 

history of GI bleed.  No history of EGD.  Colonoscopy about 6 months ago 

unremarkable.  He's been told in the past he has an ulcer but never documented 

by endoscopy.  He takes baby aspirin and Pletal on a daily basis.  Hemoglobin 

on admission 13.4 presently 11.2.  White count 10.9.  BUN 25.  Creatinine 0.7.  

Presently 17 and 0.7.





No EtOH NSAIDs or excessive usage of aspirin.








Review of Systems


RConstitutional: Denies fever, chills, sweats, weight gain, or loss.


HEENT: Negative for migraines, blurred vision or loss, earaches, drainage, 

tinnitus, oral mucosal lesions, dysphagia, or odynophagia.


Cardiac: Negative for chest pain, arrhythmias, or palpitation.


Respiratory: Negative for shortness of breath, hemoptysis, cough, or sputum 

production.


Gastrointestinal: See HPI for pertinent findings.


Genitourinary: Negative for hematuria, urgency, frequency, polyuria, dysuria, 

or penile discharge.


Musculoskeletal: Negative for muscle aches, swelling, arthritis, and 

arthralgias.  


Neurologic: Negative for stroke or TIA.


Endocrine: Negative for thyroid problems.


Skin: Negative for rash or itching.


Psychiatric: Negative history for depression and anxietyale








Past Medical History


Past Medical History: Diabetes Mellitus, Hypertension, Myocardial Infarction (MI

)


Additional Past Medical History / Comment(s): hx migraines, hx ulcer, on 

gabapentin when asked if he had neuropathy -he stated no


Last Myocardial Infarction Date:: 


History of Any Multi-Drug Resistant Organisms: None Reported


Past Surgical History: No Surgical Hx Reported


Additional Past Surgical History / Comment(s): colonoscopy denies having egd or 

heart caths


Past Anesthesia/Blood Transfusion Reactions: No Reported Reaction


Smoking Status: Former smoker





- Past Family History


  ** Sister(s)


Family Medical History: Cancer





  ** Father


Family Medical History: Unable to Obtain


Additional Family Medical History / Comment(s): pt stated he never knew his dad





  ** Mother


Additional Family Medical History / Comment(s): stated  from poss 

medication reaction





Medications and Allergies


 Home Medications











 Medication  Instructions  Recorded  Confirmed  Type


 


Aspirin [Adult Low Dose Aspirin EC] 81 mg PO DAILY 17 History


 


Ergocalciferol [Vitamin D2] 50,000 unit PO SA 17 History


 


Ramipril [Altace] 2.5 mg PO DAILY 17 History


 


metFORMIN HCL 1,000 mg PO BID 17 History


 


Cilostazol [Pletal] 100 mg PO BID 18 History


 


Fenofibrate Nanocrystallized 145 mg PO DAILY 18 History





[Fenofibrate]    


 


Gabapentin 600 mg PO HS 18 History











 Allergies











Allergy/AdvReac Type Severity Reaction Status Date / Time


 


onion Allergy  Dyspnea,throat Verified 18 13:37





   swelling  














Physical Exam


Vitals: 


 Vital Signs











  Temp Pulse Pulse Resp BP BP Pulse Ox


 


 05/10/18 08:00  97.8 F   84  16   108/70  99


 


 05/10/18 04:00  98 F   97  16   119/75  97


 


 18 23:37  98.5 F   91  16   123/71  98


 


 18 20:00  97.8 F   91  16   119/72  100


 


 18 17:06  97.3 F L   88  16   110/73  98


 


 18 16:00  98.3 F  85   18  111/72   100


 


 18 15:30   85   20  107/74   100


 


 18 15:00   87   20  114/76   100


 


 18 14:25   96   20  113/76   100


 


 18 14:00   75   20  126/85   99


 


 18 13:18  96.9 F L  96   16  125/79   97








 Intake and Output











 05/09/18 05/10/18 05/10/18





 22:59 06:59 14:59


 


Intake Total 800 80 


 


Output Total 600  


 


Balance 200 80 


 


Intake:   


 


   80 


 


    0.9  80 


 


    Sodium Chloride 0.9% 1, 800  





    000 ml @ 100 mls/hr IV .   





    Q10H ONE Rx#:[id number]   


 


Output:   


 


  Urine 600  


 


Other:   


 


  Voiding Method Toilet Toilet Toilet


 


  Weight  63.3 kg 











General appearance: The patient is alert, oriented, in no acute distress.


HET: Head is normocephalic and atraumatic.  Pupils are equal and reactive.  

Oropharynx is clear without lesions.


Neck: Supple without lymphadenopathy.  Trachea midline.


Heart: S1 S2.  Regular rate and rhythm.


Lungs: No crackles or wheezes are heard.


Abdomen: Soft, nontender, nondistended with  bowel sounds.  No peritoneal 

signs.  No palpable organomegaly or masses.


Extremities: Normal skin color and turgor.  No cyanosis, rash, ulceration, 

clubbing, or edema.  Radial and pedal pulses are 2/4 bilaterally.


Neurological: No focal deficits.  Strength and sensation are grossly intact.








Results


CBC & Chem 7: 


 05/10/18 06:06





 05/10/18 06:06


Labs: 


 Abnormal Lab Results - Last 24 Hours (Table)











  18 Range/Units





  14:05 14:05 14:05 


 


WBC  13.3 H    (3.8-10.6)  k/uL


 


RBC     (4.30-5.90)  m/uL


 


Hgb     (13.0-17.5)  gm/dL


 


Hct  36.7 L    (39.0-53.0)  %


 


Neutrophils #  11.1 H    (1.3-7.7)  k/uL


 


INR    1.2 H  (<1.2)  


 


APTT    21.6 L  (22.0-30.0)  sec


 


BUN   25 H   (9-20)  mg/dL


 


Glucose   191 H   (74-99)  mg/dL


 


POC Glucose (mg/dL)     (75-99)  mg/dL


 


Alkaline Phosphatase   30 L   ()  U/L














  18 Range/Units





  16:55 20:37 22:51 


 


WBC    12.6 H  (3.8-10.6)  k/uL


 


RBC    3.64 L  (4.30-5.90)  m/uL


 


Hgb    10.9 L  (13.0-17.5)  gm/dL


 


Hct    30.6 L  (39.0-53.0)  %


 


Neutrophils #    10.1 H  (1.3-7.7)  k/uL


 


INR     (<1.2)  


 


APTT     (22.0-30.0)  sec


 


BUN     (9-20)  mg/dL


 


Glucose     (74-99)  mg/dL


 


POC Glucose (mg/dL)  163 H  125 H   (75-99)  mg/dL


 


Alkaline Phosphatase     ()  U/L














  05/10/18 05/10/18 05/10/18 Range/Units





  05:53 06:06 06:06 


 


WBC   10.9 H   (3.8-10.6)  k/uL


 


RBC   3.81 L   (4.30-5.90)  m/uL


 


Hgb   11.2 L   (13.0-17.5)  gm/dL


 


Hct   32.8 L   (39.0-53.0)  %


 


Neutrophils #   7.9 H   (1.3-7.7)  k/uL


 


INR     (<1.2)  


 


APTT     (22.0-30.0)  sec


 


BUN     (9-20)  mg/dL


 


Glucose    121 H  (74-99)  mg/dL


 


POC Glucose (mg/dL)  120 H    (75-99)  mg/dL


 


Alkaline Phosphatase     ()  U/L














Assessment and Plan


(1) Hematemesis with nausea


Narrative/Plan: 


Possible peptic ulcer disease possible neoplasm possible AVM.


Current Visit: Yes   Status: Acute   Code(s): K92.0 - HEMATEMESIS   SNOMED Code(

s): 7822798


   





(2) Acute blood loss anemia


Current Visit: Yes   Status: Acute   Code(s): D62 - ACUTE POSTHEMORRHAGIC 

ANEMIA   SNOMED Code(s): 220132235


   





(3) Syncope


Current Visit: Yes   Status: Acute   Code(s): R55 - SYNCOPE AND COLLAPSE   

SNOMED Code(s): 220640534


   


Plan: 


1.  Patient has had no recurrent episodes of hematemesis since yesterday and is 

requesting discharge.  Inpatient endoscopy versus outpatient was discussed and 

patient prefers outpatient EGD this can be scheduled as early as Monday.  

Inpatient EGD was offered for tomorrow morning but he declined.  Case was 

discussed with attending we'll leave discharge decision to their service.  

Recommend on discharge Protonix 40 mg daily no aspirin or Plental until EGD is 

completed.


2.  Patient was advised to return to the emergency room if he has another 

episode of hematemesis or develops melena hematochezia fever chills abdominal 

pain.





Thank you for this kind referral and the opportunity to participate in the care 

of your patient.  This consultation was discussed with Dr. Alfred.  The 

impression and plan of care have been directed as dictated.

## 2018-05-11 VITALS — HEART RATE: 68 BPM | DIASTOLIC BLOOD PRESSURE: 73 MMHG | TEMPERATURE: 97.6 F | SYSTOLIC BLOOD PRESSURE: 120 MMHG

## 2018-05-11 LAB
ALBUMIN SERPL-MCNC: 3.6 G/DL (ref 3.5–5)
ALP SERPL-CCNC: 29 U/L (ref 38–126)
ALT SERPL-CCNC: 31 U/L (ref 21–72)
ANION GAP SERPL CALC-SCNC: 10 MMOL/L
AST SERPL-CCNC: 18 U/L (ref 17–59)
BASOPHILS # BLD AUTO: 0 K/UL (ref 0–0.2)
BASOPHILS NFR BLD AUTO: 0 %
BUN SERPL-SCNC: 14 MG/DL (ref 9–20)
CALCIUM SPEC-MCNC: 8.9 MG/DL (ref 8.4–10.2)
CHLORIDE SERPL-SCNC: 105 MMOL/L (ref 98–107)
CO2 SERPL-SCNC: 27 MMOL/L (ref 22–30)
EOSINOPHIL # BLD AUTO: 0.1 K/UL (ref 0–0.7)
EOSINOPHIL NFR BLD AUTO: 1 %
ERYTHROCYTE [DISTWIDTH] IN BLOOD BY AUTOMATED COUNT: 3.71 M/UL (ref 4.3–5.9)
ERYTHROCYTE [DISTWIDTH] IN BLOOD: 12.9 % (ref 11.5–15.5)
GLUCOSE BLD-MCNC: 112 MG/DL (ref 75–99)
GLUCOSE BLD-MCNC: 153 MG/DL (ref 75–99)
GLUCOSE SERPL-MCNC: 98 MG/DL (ref 74–99)
HCT VFR BLD AUTO: 31.3 % (ref 39–53)
HGB BLD-MCNC: 11.1 GM/DL (ref 13–17.5)
LYMPHOCYTES # SPEC AUTO: 2.2 K/UL (ref 1–4.8)
LYMPHOCYTES NFR SPEC AUTO: 33 %
MCH RBC QN AUTO: 29.8 PG (ref 25–35)
MCHC RBC AUTO-ENTMCNC: 35.4 G/DL (ref 31–37)
MCV RBC AUTO: 84.3 FL (ref 80–100)
MONOCYTES # BLD AUTO: 0.2 K/UL (ref 0–1)
MONOCYTES NFR BLD AUTO: 4 %
NEUTROPHILS # BLD AUTO: 4.1 K/UL (ref 1.3–7.7)
NEUTROPHILS NFR BLD AUTO: 61 %
PLATELET # BLD AUTO: 201 K/UL (ref 150–450)
POTASSIUM SERPL-SCNC: 3.8 MMOL/L (ref 3.5–5.1)
PROT SERPL-MCNC: 5.8 G/DL (ref 6.3–8.2)
SODIUM SERPL-SCNC: 142 MMOL/L (ref 137–145)
WBC # BLD AUTO: 6.8 K/UL (ref 3.8–10.6)

## 2018-05-11 PROCEDURE — 0DJ08ZZ INSPECTION OF UPPER INTESTINAL TRACT, VIA NATURAL OR ARTIFICIAL OPENING ENDOSCOPIC: ICD-10-PCS

## 2018-05-11 RX ADMIN — INSULIN ASPART SCH: 100 INJECTION, SOLUTION INTRAVENOUS; SUBCUTANEOUS at 06:34

## 2018-05-11 RX ADMIN — PANTOPRAZOLE SODIUM SCH MG: 40 INJECTION, POWDER, FOR SOLUTION INTRAVENOUS at 08:38

## 2018-05-11 NOTE — P.PCN
Date of Procedure: 05/11/18


Procedure(s) Performed: 


BRIEF HISTORY: Patient is a 57-year-old, pleasant, white male, admitted to the 

hospital with acute upper GI bleed.  He had 7 episodes of hematemesis 

yesterday.  Hemoglobin is 11.2 g/dL.  He scheduled for an upper endoscopy to 

evaluate further.. 





PROCEDURE PERFORMED: Esophagogastroduodenoscopy.





PREOPERATIVE DIAGNOSIS: Acute upper GI bleed. 





IV sedation per anesthesia. 





PROCEDURE: After informed consent was obtained, the patient  was brought into 

the endoscopy unit. IV sedation was administered by Anesthesia under continuous 

monitoring. Initially the Olympus GIF-140 video endoscope was inserted into the 

mouth. Esophagus intubated without any difficulty. It was gradually advanced 

into the stomach and duodenum and carefully examined. The bulb and the second 

part of the duodenum appeared normal. The scope at this time was withdrawn to 

the stomach, adequately insufflated with air, and upon careful examination, 

mucosa of the antrum, had mild erythema consistent with minimal gastritis.  The 

body, cardia and the fundus appeared normal. The scope was then withdrawn into 

the esophagus. The GE junction was located at 41 cm from the incisors.  There 

was a superficial Rosalva-Mercado tear at the GE junction extending 2 cm proximal 

to the GE junction and a small adherent clot but no active bleeding.  The rest 

of the esophagus appeared normal. There were no erosions or ulcerations seen 

and the patient tolerated the procedure well. 





IMPRESSION: 


1.  Superficial Rosalva-Mercado tear at the GE junction with a small adherent 

clot but no active bleeding.


2.  Minimal gastritis.





RECOMMENDATIONS: The findings of this examination were discussed with the 

patient.  He will be continued on Protonix 40 mg daily.  Diet will be advanced 

as tolerated.  Patient can be discharged home today..

## 2018-05-11 NOTE — P.DS
Providers


Date of admission: 


05/09/18 15:16





Expected date of discharge: 05/11/18


Attending physician: 


Annmarie Mera





Primary care physician: 


Susie Theodore





[facility]: 





discharge diagnosis





1.  Hematemesis: likely secondary to superficial  Rosalva-Mercado tear at the GE 

junction with a small adherent  clot with no active bleeding noted on EGD.  EGD 

also revealed minimal gastritis.  GI service recommended Protonix 40mg dailiy





2.  Syncopal episode likely vasovagal due to the Vomiting.  Echo shows an EF of 

55-60





3.  Leukocytosis present on admission likely reactive: White count has 

normalized





4.  Diabetes mellitus type 2: Resume metformin and sliding scale coverage





5.  Essential hypertension: blood pressures have improved.  Resume 





6.  Peripheral vascular disease: Hold Pletal for now due to the hematemesis





7.  Hyperlipidemia continue fenofibrate





Hospital course





This is a 57-year-old male, a patient of [facility].  Patient has 

a past medical history of diabetes mellitus, hypertension, myocardial infarction

, hyperlipidemia, peripheral vascular disease.  Patient presents to the 

emergency room after having hematemesis as well as a syncopal episode.  Patient 

reports that he had not slept well the night before and woke up in the morning 

and vomited what he presumed as blood 3 times.  He does report drinking a lot 

of coffee.  When he was coming back from the restroom he passed out.  He is 

unsure of how long he passed out for.  Family members called EMS.  And 

initially when EMS arrived patient was unresponsive.  When he came to he was 

being placed in the ambulance.  Hemoglobin on admission 10.9 white count 

elevated at 12.6.  Gastric occult blood was negative on admission.  Patient had 

one episode of emesis in the ER.  He is currently nothing by mouth.  He's had 

no further emesis.  He aspirin and Pletal have been placed on hold.  Repeat 

hemoglobin this morning is 11.2.  Patient seen by GI service and offered have 

an EGD for tomorrow morning.  At this time patient is refusing.  Also requests 

patient syncopal episode and echocardiogram has been ordered as well as 

orthostatics.  Patient has been up and ambulating this morning without any 

dizziness or lightheadedness.  Denies any chest pain or shortness of breath.  

Denies any further episodes of vomiting.  Denies any bowel movement changes or 

urinary symptoms.  No previous history of GI bleed.  Denies any nicotine use or 

alcohol use.  EKG normal sinus rhythm on admission.  Patient does admit to 

having issues with heartburn on a regular basis.  He does drink about 4 cups of 

coffee a day.





patient has had no further episodes of hematemesis.  EGD results as stated 

above.  GI service recommending Protonix. patient is medical stable for 

discharge.  He has been cleared by GI servicefor discharge.





I performed an examination of the patient and discussed their management with 

the physician Assistant.  I have reviewed the Physician Assistant's notes and 

agree with the documented findings and plan of care

















Patient Condition at Discharge: Stable





Plan - Discharge Summary


Discharge Rx Participant: No


New Discharge Prescriptions: 


New


   Pantoprazole Sodium [Protonix] 40 mg PO DAILY #30 tablet.





Continue


   Ergocalciferol [Vitamin D2 (DRISDOL)] 50,000 unit PO SA


   Ramipril [Altace] 2.5 mg PO DAILY


   metFORMIN HCL 1,000 mg PO BID


   Fenofibrate Nanocrystallized [Fenofibrate] 145 mg PO DAILY


   Gabapentin 600 mg PO HS


   Aspirin [Adult Low Dose Aspirin EC] 81 mg PO DAILY #0


   Cilostazol [Pletal] 100 mg PO BID #0


Discharge Medication List





Ergocalciferol [Vitamin D2 (DRISDOL)] 50,000 unit PO SA 03/30/17 [History]


Ramipril [Altace] 2.5 mg PO DAILY 03/30/17 [History]


metFORMIN HCL 1,000 mg PO BID 03/30/17 [History]


Fenofibrate Nanocrystallized [Fenofibrate] 145 mg PO DAILY 05/09/18 [History]


Gabapentin 600 mg PO HS 05/09/18 [History]


Aspirin [Adult Low Dose Aspirin EC] 81 mg PO DAILY #0 05/11/18 [Rx]


Cilostazol [Pletal] 100 mg PO BID #0 05/11/18 [Rx]


Pantoprazole Sodium [Protonix] 40 mg PO DAILY #30 tablet. 05/11/18 [Rx]








Follow up Appointment(s)/Referral(s): 


Susie Theodore DO [Primary Care Provider] - 1 Week


Activity/Diet/Wound Care/Special Instructions: 


Diet: cardiac


Activity: as tolerated


Discharge Disposition: HOME SELF-CARE

## 2021-11-15 ENCOUNTER — HOSPITAL ENCOUNTER (OUTPATIENT)
Dept: HOSPITAL 47 - ORWHC2ENDO | Age: 61
Discharge: HOME | End: 2021-11-15
Attending: SURGERY
Payer: COMMERCIAL

## 2021-11-15 VITALS — RESPIRATION RATE: 16 BRPM | HEART RATE: 63 BPM | DIASTOLIC BLOOD PRESSURE: 81 MMHG | SYSTOLIC BLOOD PRESSURE: 122 MMHG

## 2021-11-15 VITALS — TEMPERATURE: 97.3 F

## 2021-11-15 VITALS — BODY MASS INDEX: 22.3 KG/M2

## 2021-11-15 DIAGNOSIS — E78.5: ICD-10-CM

## 2021-11-15 DIAGNOSIS — Z87.891: ICD-10-CM

## 2021-11-15 DIAGNOSIS — M19.90: ICD-10-CM

## 2021-11-15 DIAGNOSIS — I10: ICD-10-CM

## 2021-11-15 DIAGNOSIS — K22.6: Primary | ICD-10-CM

## 2021-11-15 DIAGNOSIS — Z79.84: ICD-10-CM

## 2021-11-15 DIAGNOSIS — E11.9: ICD-10-CM

## 2021-11-15 DIAGNOSIS — I25.2: ICD-10-CM

## 2021-11-15 LAB
GLUCOSE BLD-MCNC: 103 MG/DL (ref 75–99)
GLUCOSE BLD-MCNC: 116 MG/DL (ref 75–99)

## 2021-11-15 PROCEDURE — 43239 EGD BIOPSY SINGLE/MULTIPLE: CPT

## 2021-11-15 NOTE — P.GSHP
History of Present Illness


H&P Date: 11/15/21


Chief Complaint: GI bleed





This is a 61-year-old male who presents today for EGD.  He's had intermittent 

issues with emesis with GI bleed.  Patient's presumed to have a previous history

of Rosalva-Mercado tear.





Past Medical History


Past Medical History: Diabetes Mellitus, Hyperlipidemia, Hypertension, 

Myocardial Infarction (MI), Osteoarthritis (OA)


Additional Past Medical History / Comment(s): coughed up "chunck of blood" 2 

months ago, hx migraines, hx ulcer, on gabapentin when asked if he had 

neuropathy -he stated no


Last Myocardial Infarction Date:: 


History of Any Multi-Drug Resistant Organisms: None Reported


Past Surgical History: Tonsillectomy


Additional Past Surgical History / Comment(s): colonoscopy


Past Anesthesia/Blood Transfusion Reactions: No Reported Reaction


Smoking Status: Former smoker





- Past Family History


  ** Sister(s)


Family Medical History: Cancer


Additional Family Medical History / Comment(s): colon





  ** Father


Family Medical History: Unable to Obtain


Additional Family Medical History / Comment(s): pt stated he never knew his dad





  ** Mother


Additional Family Medical History / Comment(s): stated  from poss medication

reaction





Medications and Allergies


                                Home Medications











 Medication  Instructions  Recorded  Confirmed  Type


 


metFORMIN HCL [Glucophage] 1,000 mg PO BID 03/30/17 11/15/21 History


 


Fenofibrate Nanocrystallized 145 mg PO DAILY 05/09/18 11/15/21 History





[Fenofibrate]    


 


Gabapentin 600 mg PO HS 05/09/18 11/15/21 History


 


Ramipril [Altace] 5 mg PO DAILY 11/11/21 11/15/21 History








                                    Allergies











Allergy/AdvReac Type Severity Reaction Status Date / Time


 


onion Allergy  Dyspnea,throat Verified 21 14:39





   swelling  














Surgical - Exam


                                   Vital Signs











Temp Pulse Resp BP Pulse Ox


 


 97.3 F L  69   16   165/90   98 


 


 11/15/21 11:51  11/15/21 11:51  11/15/21 11:51  11/15/21 11:51  11/15/21 11:51














- General


well developed, well nourished, no distress





- Eyes


PERRL





- ENT


normal pinna





- Neck


no masses





- Respiratory


normal expansion





- Cardiovascular


Rhythm: regular





- Abdomen


Abdomen: soft, non tender





Results





- Labs


                  Abnormal Lab Results - Last 24 Hours (Table)











  11/15/21 Range/Units





  11:55 


 


POC Glucose (mg/dL)  116 H  (75-99)  mg/dL














Assessment and Plan


Assessment: 





History of hematemesis.  We'll perform EGD.

## 2021-11-15 NOTE — P.OP
Date of Procedure: 11/15/21


Preoperative Diagnosis: 


Hematemesis





Rosalva-Mercado tear


Postoperative Diagnosis: 


Antral gastritis


Procedure(s) Performed: 


EGD


Anesthesia: MAC


Surgeon: Bandar Hines


Pathology: other (Antrum)


Condition: stable


Disposition: PACU


Description of Procedure: 


The patient's placed on the endoscopy table in the lateral position.  He 

received IV sedation.  The gastroscope placed oropharynx passed in the esophagus

into the stomach.  Scope was then placed through the pylorus.  The first and 

second portion of the duodenum appeared normal.  The scope was then brought back

the antrum this is minimal inflamed.  A biopsies performed.  The scope was then 

retroflexed and the remainder of the stomach appeared normal.  There was no 

significant hiatal hernia.  The GE junction was at 47.  The distal esophagus 

appeared normal.  There is no evidence of any tears of the lower esophagus.  The

proximal esophagus appeared normal.  Scope withdrawn for patient.





There is no evidence of any upper GI bleed.  There was only some minimal antral 

gastritis.